# Patient Record
(demographics unavailable — no encounter records)

---

## 2025-07-15 NOTE — REASON FOR VISIT
If still having symptoms, needs evaluation in urgent care.    Emily Ahmadi,   1/6/2021     [Initial Visit] : an initial visit for [Back Pain] : back pain [Neck Pain] : neck pain

## 2025-07-16 NOTE — PHYSICAL EXAM
[de-identified] : MRI and XR which were performed in the office shows diffuse idiopathic skeletal hyperostosis affecting the anterior spine, with bridging syndesmophytes at C3-4, large anterior spurs at C4-5 and C5-6. Though, there appears to still be some mobility at these levels.

## 2025-07-16 NOTE — PHYSICAL EXAM
[de-identified] : MRI and XR which were performed in the office shows diffuse idiopathic skeletal hyperostosis affecting the anterior spine, with bridging syndesmophytes at C3-4, large anterior spurs at C4-5 and C5-6. Though, there appears to still be some mobility at these levels.

## 2025-07-16 NOTE — HISTORY OF PRESENT ILLNESS
[de-identified] : Robbin Concepcion is a 52 y/o male following up for recently diagnosed cervical stenosis and was recommended for anteior cervical discectomy and fusion. He is well-known to myself, as we used to work together. 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 He is considering having his colleagues at [facility] do the surgery for reasons of convenience and locale. He is here with his wife. He reports no distinct injury, though he was in the army and played semi-professional football. His symptoms, including pain in the neck, began to increase over the past three to four years. He has known of numbness, tingling, pins and needles, or weakness. He is neurologically intact, has no long tract signs or hyperreflexia. He has no issues related to weakness.

## 2025-07-16 NOTE — HISTORY OF PRESENT ILLNESS
[de-identified] : Robbin Concepcion is a 52 y/o male following up for recently diagnosed cervical stenosis and was recommended for anteior cervical discectomy and fusion. He is well-known to myself, as we used to work together. 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 He is considering having his colleagues at [facility] do the surgery for reasons of convenience and locale. He is here with his wife. He reports no distinct injury, though he was in the army and played semi-professional football. His symptoms, including pain in the neck, began to increase over the past three to four years. He has known of numbness, tingling, pins and needles, or weakness. He is neurologically intact, has no long tract signs or hyperreflexia. He has no issues related to weakness.

## 2025-07-16 NOTE — PLAN
[TextEntry] : Pt has failed conservative treatment, including but not limited to activity modification, anti-inflammatories, physiotherapy, home cervical traction unit. homoeopathic remedies, massage, and other. Pt will drop off the actual films on disc in my mailbox this evening. Pt will be contacted tomorrow regarding the plans for surgical intervention. Pt mentioned that the current surgeons that he works with at [facility] had recommended a hybrid construct, extending up to C3. My decision is to be determined based on my review of the aforementioned radiographs on disk.

## 2025-07-16 NOTE — END OF VISIT
[FreeTextEntry3] : Documented by Jordana Griffith acting as a scribe for Dr. Fran Andre. 07/15/2025  All medical record entries made by the Scribe were at my, Dr. Fran Andre, direction and personally dictated by me on 07/15/2025. I have reviewed the chart and agree that the record accurately reflects my personal performance of the history, physical exam, assessment and plan. I have also personally directed, reviewed, and agreed with the chart.

## 2025-07-16 NOTE — ADDENDUM
[FreeTextEntry1] : I, Jordana Griffith, acted as a scribe on behalf of Dr. Fran Andre on 07/15/2025.